# Patient Record
Sex: FEMALE | Race: WHITE | URBAN - NONMETROPOLITAN AREA
[De-identification: names, ages, dates, MRNs, and addresses within clinical notes are randomized per-mention and may not be internally consistent; named-entity substitution may affect disease eponyms.]

---

## 2017-04-06 ENCOUNTER — HOSPITAL ENCOUNTER (OUTPATIENT)
Dept: WOUND CARE | Age: 42
Discharge: HOME | End: 2017-04-06
Attending: INTERNAL MEDICINE | Admitting: INTERNAL MEDICINE

## 2017-04-06 VITALS
WEIGHT: 133 LBS | TEMPERATURE: 98.2 F | BODY MASS INDEX: 22.16 KG/M2 | RESPIRATION RATE: 16 BRPM | HEART RATE: 88 BPM | HEIGHT: 65 IN

## 2017-04-06 DIAGNOSIS — S21.201A OPEN WOUND OF RIGHT SIDE OF BACK, INITIAL ENCOUNTER: ICD-10-CM

## 2017-04-06 RX ORDER — NORETHINDRONE ACETATE AND ETHINYL ESTRADIOL 1; .02 MG/1; MG/1
1 TABLET ORAL DAILY
Status: ON HOLD | COMMUNITY
End: 2017-06-28

## 2017-04-06 RX ORDER — DIVALPROEX SODIUM 250 MG/1
500 TABLET, DELAYED RELEASE ORAL 2 TIMES DAILY
Status: ON HOLD | COMMUNITY
End: 2017-05-18 | Stop reason: CLARIF

## 2017-04-06 RX ORDER — MIRTAZAPINE 45 MG/1
45 TABLET, FILM COATED ORAL NIGHTLY
Status: ON HOLD | COMMUNITY
End: 2017-06-27 | Stop reason: HOSPADM

## 2017-04-06 RX ORDER — AMITRIPTYLINE HYDROCHLORIDE 50 MG/1
50 TABLET, FILM COATED ORAL NIGHTLY
Status: ON HOLD | COMMUNITY
End: 2017-06-28 | Stop reason: HOSPADM

## 2017-04-06 RX ORDER — NYSTATIN 100000 U/G
CREAM TOPICAL PRN
COMMUNITY

## 2017-04-06 RX ORDER — LORATADINE 10 MG/1
10 CAPSULE, LIQUID FILLED ORAL DAILY
Status: ON HOLD | COMMUNITY
End: 2017-06-27 | Stop reason: HOSPADM

## 2017-04-06 RX ORDER — FLUCONAZOLE 150 MG/1
150 TABLET ORAL ONCE
COMMUNITY
End: 2017-05-18

## 2017-04-10 LAB
CULTURE: NORMAL
ORGANISM: NORMAL
REPORT STATUS: NORMAL
REQUEST PROBLEM: NORMAL
SPECIMEN: NORMAL

## 2017-04-13 ENCOUNTER — HOSPITAL ENCOUNTER (OUTPATIENT)
Dept: WOUND CARE | Age: 42
Discharge: HOME | End: 2017-04-13
Attending: INTERNAL MEDICINE | Admitting: INTERNAL MEDICINE

## 2017-04-13 VITALS — RESPIRATION RATE: 16 BRPM | TEMPERATURE: 98.2 F

## 2017-04-13 DIAGNOSIS — S21.201A OPEN WOUND OF RIGHT SIDE OF BACK, INITIAL ENCOUNTER: Primary | ICD-10-CM

## 2017-04-13 RX ORDER — DOXYCYCLINE HYCLATE 100 MG
100 TABLET ORAL 2 TIMES DAILY
Qty: 20 TABLET | Refills: 0 | Status: SHIPPED | OUTPATIENT
Start: 2017-04-13 | End: 2017-04-23

## 2017-04-27 ENCOUNTER — HOSPITAL ENCOUNTER (OUTPATIENT)
Dept: WOUND CARE | Age: 42
Discharge: HOME | End: 2017-04-27
Attending: INTERNAL MEDICINE | Admitting: INTERNAL MEDICINE

## 2017-04-27 DIAGNOSIS — S21.201A OPEN WOUND OF RIGHT SIDE OF BACK, INITIAL ENCOUNTER: Primary | ICD-10-CM

## 2017-05-18 PROBLEM — J18.9 PNEUMONIA: Status: ACTIVE | Noted: 2017-05-18

## 2017-05-18 PROBLEM — F72 MENTAL RETARDATION, SEVERE (I.Q. 20-34): Chronic | Status: ACTIVE | Noted: 2017-05-18

## 2017-05-22 PROBLEM — J96.22 ACUTE ON CHRONIC RESPIRATORY FAILURE WITH HYPERCAPNIA (HCC): Status: ACTIVE | Noted: 2017-05-22

## 2017-05-22 PROBLEM — J96.92 RESPIRATORY FAILURE WITH HYPERCAPNIA (HCC): Status: ACTIVE | Noted: 2017-05-22

## 2017-05-22 PROBLEM — R00.1 BRADYCARDIA: Status: ACTIVE | Noted: 2017-05-22

## 2017-06-25 PROBLEM — K57.20 DIVERTICULITIS OF LARGE INTESTINE WITH PERFORATION AND ABSCESS WITHOUT BLEEDING: Status: ACTIVE | Noted: 2017-06-25

## 2017-11-30 PROBLEM — Z93.1 G TUBE FEEDINGS (HCC): Status: ACTIVE | Noted: 2017-11-30

## 2018-07-26 ENCOUNTER — HOSPITAL ENCOUNTER (OUTPATIENT)
Dept: LAB | Age: 43
Discharge: HOME | End: 2018-07-31
Attending: INTERNAL MEDICINE | Admitting: INTERNAL MEDICINE

## 2018-07-26 LAB
ALBUMIN SERPL-MCNC: 3.1 GM/DL (ref 3.4–5)
ALP BLD-CCNC: 122 IU/L (ref 40–129)
ALT SERPL-CCNC: 19 U/L (ref 10–40)
ANION GAP SERPL CALCULATED.3IONS-SCNC: 10 MMOL/L (ref 4–16)
AST SERPL-CCNC: 27 IU/L (ref 15–37)
BILIRUB SERPL-MCNC: 0.2 MG/DL (ref 0–1)
BUN BLDV-MCNC: 25 MG/DL (ref 6–23)
CALCIUM SERPL-MCNC: 9.6 MG/DL (ref 8.3–10.6)
CHLORIDE BLD-SCNC: 97 MMOL/L (ref 99–110)
CO2: 33 MMOL/L (ref 21–32)
CREAT SERPL-MCNC: 0.4 MG/DL (ref 0.6–1.1)
GFR AFRICAN AMERICAN: >60 ML/MIN/1.73M2
GFR NON-AFRICAN AMERICAN: >60 ML/MIN/1.73M2
GLUCOSE BLD-MCNC: 89 MG/DL (ref 70–99)
HCT VFR BLD CALC: 36.7 % (ref 37–47)
HEMOGLOBIN: 10.9 GM/DL (ref 12.5–16)
MCH RBC QN AUTO: 30.4 PG (ref 27–31)
MCHC RBC AUTO-ENTMCNC: 29.7 % (ref 32–36)
MCV RBC AUTO: 102.5 FL (ref 78–100)
PDW BLD-RTO: 16.1 % (ref 11.7–14.9)
PLATELET # BLD: 258 K/CU MM (ref 140–440)
PMV BLD AUTO: 10.4 FL (ref 7.5–11.1)
POTASSIUM SERPL-SCNC: 4.6 MMOL/L (ref 3.5–5.1)
RBC # BLD: 3.58 M/CU MM (ref 4.2–5.4)
SODIUM BLD-SCNC: 140 MMOL/L (ref 135–145)
TOTAL PROTEIN: 7.2 GM/DL (ref 6.4–8.2)
WBC # BLD: 8.9 K/CU MM (ref 4–10.5)

## 2018-07-27 LAB
HCT VFR BLD CALC: 37.2 % (ref 37–47)
HEMOGLOBIN: 11.2 GM/DL (ref 12.5–16)
MCH RBC QN AUTO: 30.9 PG (ref 27–31)
MCHC RBC AUTO-ENTMCNC: 30.1 % (ref 32–36)
MCV RBC AUTO: 102.5 FL (ref 78–100)
PDW BLD-RTO: 15.9 % (ref 11.7–14.9)
PLATELET # BLD: 270 K/CU MM (ref 140–440)
PMV BLD AUTO: 9.2 FL (ref 7.5–11.1)
RBC # BLD: 3.63 M/CU MM (ref 4.2–5.4)
WBC # BLD: 6.5 K/CU MM (ref 4–10.5)

## 2018-08-01 ENCOUNTER — HOSPITAL ENCOUNTER (OUTPATIENT)
Dept: LAB | Age: 43
Discharge: HOME | End: 2018-08-31
Attending: INTERNAL MEDICINE | Admitting: INTERNAL MEDICINE

## 2018-08-01 ENCOUNTER — OUTSIDE SERVICES (OUTPATIENT)
Dept: INTERNAL MEDICINE CLINIC | Age: 43
End: 2018-08-01

## 2018-08-01 DIAGNOSIS — F72 MENTAL RETARDATION, SEVERE (I.Q. 20-34): Chronic | ICD-10-CM

## 2018-08-01 DIAGNOSIS — G80.9 CEREBRAL PALSY, UNSPECIFIED TYPE (HCC): Primary | ICD-10-CM

## 2018-08-01 DIAGNOSIS — G40.909 SEIZURE DISORDER (HCC): ICD-10-CM

## 2018-08-01 DIAGNOSIS — Z93.1 G TUBE FEEDINGS (HCC): ICD-10-CM

## 2018-08-01 PROCEDURE — 99305 1ST NF CARE MODERATE MDM 35: CPT | Performed by: INTERNAL MEDICINE

## 2018-08-02 LAB
HCT VFR BLD CALC: 41 % (ref 37–47)
HEMOGLOBIN: 12.2 GM/DL (ref 12.5–16)
MCH RBC QN AUTO: 30.4 PG (ref 27–31)
MCHC RBC AUTO-ENTMCNC: 29.8 % (ref 32–36)
MCV RBC AUTO: 102.2 FL (ref 78–100)
PDW BLD-RTO: 15.6 % (ref 11.7–14.9)
PLATELET # BLD: 355 K/CU MM (ref 140–440)
PMV BLD AUTO: 9.5 FL (ref 7.5–11.1)
RBC # BLD: 4.01 M/CU MM (ref 4.2–5.4)
WBC # BLD: 7.8 K/CU MM (ref 4–10.5)

## 2018-08-02 NOTE — PROGRESS NOTES
History and physical  ___________________________    Robyn Enriquez's  is 1975  SEEN ON 2018 at [facility]  ___________________________    S:   Patient is seen today and discussed with the nurses. Patient is a 79-year-old female with a history of cerebral palsy, chronic seizures and some dysphagia as she has GJ tube. Patient is totally dependent on ADLs she is a total care. Patient's parent was taking care of her. She is here for respite care. Patient looks comfortable. Patient history is obtained from her old records. Patient's family not available. Nurses report no cough or congestion. No nausea vomiting or diarrhea. No fever or chills. ALLERGIES:  .  No known allergies      PAST MEDICAL HISTORY:  .  Cerebral palsy  . Diverticulitis large intestine with perforation and abscess without bleeding  . Malfunction of gastrostomy tube. Mickeal Rink History of pneumonia  . Scoliosis  . Seizures  . Gastrojejunostomy tube  . Microcephaly  . GERD  . Status post vagus nerve stimulator  . Menstrual management with oral contraceptives  . Insomnia        PAST SURGICAL HISTORY:  .  Gastrojejunostomy tube  . Status post vagus nerve stimulator      SOCIAL HISTORY:  .  Patient is single. No history of ethanol use or smoking  . Patient's parents taking care of her at home        Vital signs: BP 91/64, temp 98.2. Pulse 80. RR 16.  O2 sat 91% on room air. Weight 1:30 pounds  GENERAL:  Patient is awake lying in bed. Does not communicate  HEENT:  Conjunctiva pink, no scleral icterus. ENT clear. NECK:  Supple. No jugular venous distention noted. No masses felt,  CARDIOVASCULAR:  Normal S1 and S2    PULMONARY:  No respiratory distress. No wheezes or rales. ABDOMEN:  Soft and non-tender,no masses  or organomegaly. Has J-tube  EXTREMITIES:  No cyanosis, clubbing, or significant edema.   SKIN: Skin is warm and dry. NEUROLOGICAL:  Patient in bed. Not communicating. Awake. Assessment:  .  Cerebral palsy  . History of seizure disorder  . Intellectual disability  . Scoliosis  . Gait disturbance  . Dysphagia  . Gastrojejunostomy  . Status post vagus neurostimulator      Plan:  Patient is stable. She is here for respite care  Medication were reviewed  Continue current treatment.   Patient is on clobazam 20 mg at bedtime, amitriptyline 150 mg daily, mirtazapine 45 mg daily, omeprazole suspension, multivitamin liquid, loratadine syrup, valproic acid 250 mg per 5 ML 10 mL 3 times a day, estrogen,

## 2018-09-01 ENCOUNTER — HOSPITAL ENCOUNTER (OUTPATIENT)
Dept: LAB | Age: 43
Discharge: HOME | End: 2018-09-01
Attending: INTERNAL MEDICINE | Admitting: INTERNAL MEDICINE